# Patient Record
Sex: MALE | Race: WHITE | NOT HISPANIC OR LATINO | Employment: FULL TIME | URBAN - METROPOLITAN AREA
[De-identification: names, ages, dates, MRNs, and addresses within clinical notes are randomized per-mention and may not be internally consistent; named-entity substitution may affect disease eponyms.]

---

## 2017-07-28 ENCOUNTER — ALLSCRIPTS OFFICE VISIT (OUTPATIENT)
Dept: OTHER | Facility: OTHER | Age: 47
End: 2017-07-28

## 2017-08-28 ENCOUNTER — GENERIC CONVERSION - ENCOUNTER (OUTPATIENT)
Dept: OTHER | Facility: OTHER | Age: 47
End: 2017-08-28

## 2018-01-15 NOTE — CONSULTS
Chief Complaint  Chief Complaint Free Text Note Form: Patient is here today for MWM consult  Stop ban/8  History of Present Illness  Free Text HPI: Obesity-  Severity: Severe  Onset: 30's  Modifiers: Had been working two jobs during his 's and increased carbohydrates/sugary snacks as a source of energy  Now working 3rd shift and overtime States diets have worked for him in the past, including ideal protein, Atkins, ketosis driven diets  Typically stops after 6 weeks and gains the weight back  Finds it difficult to use supplements  Admits to Increased appetite and cravings  Does not include vegetables in his diet due to preference  Drinks gallon or more of sweet tea per day  Recently stopped drinking diet soda  Minimal water intake  Job is physical at times, but overall has a sedentary lifestyle  Associated Symptoms: joint pain    B: whatever is around, ham and cheese on an everything bagel  L: varies, depending on work, hot lunch  D: Arielle York, states "heavy, rich meals "   Heavy in carbohydrates  Past Medical History    1  History of Essential hypertension, benign (401 1) (I10)  Active Problems And Past Medical History Reviewed: The active problems and past medical history were reviewed and updated today  Assessment    1  Morbid or severe obesity due to excess calories (278 01) (E66 01)   2  Hypertension (401 9) (I10)   3  Mixed hyperlipidemia (272 2) (E78 2)   4  Elevated glucose (790 29) (R73 09)    Plan   1  (1) CBC/ PLT (NO DIFF); Status:Active; Requested for:36Ffd3502;    2  (1) COMPREHENSIVE METABOLIC PANEL; Status:Active; Requested for:67Nxx4410;    3  (1) HEMOGLOBIN A1C; Status:Active; Requested for:08Ilk3784;    4  (1) INSULIN, FASTING; Status:Active; Requested for:75Vyk6783;    5  (1) LIPID PANEL, FASTING; Status:Active; Requested for:10Eqc8786;    6  (1) TSH WITH FT4 REFLEX; Status:Active;  Requested for:34Uwg0157;     Discussion/Summary  Discussion Summary:   Patient seen and examined with PA  Agree with assessment/plan with exceptions as below: Morbid obesity-unsure on how he would like to proceed, he will simultaneously pursue surgical and nonsurgical pathways as his insurance company requires 6 months  He will start with our conservative pathway and will complete informational seminar online  Elevated glucose/? DM: Patient reports his A1c is 7% but not on medication  Recheck A1c  HTN: elevated  recently restarted meds, follow up w/ PCP  HPL: c/w statin  RTC in 3mos    78-year-old male w/ hypertension, hyperlipidemia morbid obesity here to pursue medical weight management to improve weight and health  Obesity class 3:  -discussed options of conservative vs MEEK program +/- meal replacement vs VLCD and bariatric surgery  -initial focus of 5-10% weight loss over 3-6 mos for improved health  -screening labs  -Reviewed mechanisms, risk, benefits of gastric sleeve and bypass  Pt will consider surgery as an option and plans to attend info seminar  HTN  Risks of untreated/uncontrolled HTN reviewed  Continue current tx  Follow up with PCP  Hyperlipidemia  Risks of untreated/uncontrolled hyperlipidemia reviewed  Continue current tx  Follow up with PCP  Suspect sleep apnea  +stop bang  Reviewed risks of undiagnosed and untreated sleep apnea  PT declines sleep study referral at this time  RTC in 1mos  Goals and Barriers: The patient has the current Goals:   Goals:  1  Food log www  myfitnesspal com  2  No sugary beverages  At least 64oz of water daily  3  Increase physical activity by 10 minutes daily  4  2437-7275 calories, see sample menu  Patient's Capacity to Self-Care: Patient is able to Self-Care  Patient Education: Educational resources provided: Weight loss packet provided  Understands and agrees with treatment plan: The treatment plan was reviewed with the patient/guardian   The patient/guardian understands and agrees with the treatment plan   Self Referrals:   Self Referrals: Yes      Review of Systems  Focused-Male:   Constitutional: fever  ENT: no sore throat  Cardiovascular: no chest pain and no palpitations  Respiratory: shortness of breath and SOB with high humidity, but no cough and no wheezing  Gastrointestinal: Denies GERD, but no nausea, no vomiting, no constipation and no diarrhea  Genitourinary: no dysuria  Musculoskeletal: arthralgias  Other Symptoms: Psych:  ROS Reviewed:   ROS reviewed  Active Problems    1  Hypertension (401 9) (I10)    Surgical History    1  History of Surgery Excision Lipoma  Surgical History Reviewed: The surgical history was reviewed and updated today  Family History  Mother    1  Family history of chronic obstructive pulmonary disease (V17 6) (Z82 5)   2  Family history of hypertension (V17 49) (Z82 49)  Father    3  Family history of    4  Family history of cardiac disorder (V17 49) (Z82 49)   5  Family history of diabetes mellitus (V18 0) (Z83 3)   6  Family history of hypertension (V17 49) (Z82 49)   7  Family history of myocardial infarction (V17 3) (Z82 49)  Family History Reviewed: The family history was reviewed and updated today  Social History    · Never a smoker   · No illicit drug use   · Non drinker / no alcohol use  Social History Reviewed: The social history was reviewed and updated today  Current Meds   1  HydroCHLOROthiazide 25 MG Oral Tablet; Therapy: (Recorded:91Dcv2464) to Recorded   2  Simvastatin 20 MG Oral Tablet; Therapy: (Recorded:02Bfj1216) to Recorded  Medication List Reviewed: The medication list was reviewed and updated today  Vitals  Vital Signs    Recorded: 87Lhi5313 10:20AM Recorded: 35Nkj6744 09:26AM   Temperature  97 4 F   Heart Rate  92   Systolic 515 531, LUE, Sitting   Diastolic 90 90, LUE, Sitting   Height  6 ft 1 in   Weight  382 lb 1 6 oz   BMI Calculated  50 41   BSA Calculated  2 83   Waist Circumference  62 5 in     Neck Circumference  19 in      Physical Exam    Constitutional   General appearance: No acute distress, well appearing and well nourished  Eyes   Conjunctiva and lids: No swelling, erythema, or discharge  Ears, Nose, Mouth, and Throat   Oropharynx: Normal with no erythema, edema, exudate or lesions  Pulmonary   Respiratory effort: No increased work of breathing or signs of respiratory distress  Auscultation of lungs: Clear to auscultation, equal breath sounds bilaterally, no wheezes, no rales, no rhonci  Cardiovascular   Auscultation of heart: Normal rate and rhythm, normal S1 and S2, without murmurs  Examination of extremities for edema and/or varicosities: Abnormal   trace-1+ tibial edema  Abdomen   Abdomen: Abnormal   obese, soft, nontender     Musculoskeletal   Gait and station: Normal     Psychiatric   Orientation to person, place and time: Normal          Results/Data  STOP BANG Questionnaire [id number] 09:31AM User, Ahs     Test Name Result Flag Reference   STOP BANG Questionnaire Risk of YAMILEX Intermediate Risk     Snoring: No  Tired: No  Observed: No  Blood Pressure: Yes  BMI: Yes  Age: No  Neck Circumference: Yes  Gender: Yes   STOP BANG Questionnaire YAMILEX Total Score 4     Snoring: No  Tired: No  Observed: No  Blood Pressure: Yes  BMI: Yes  Age: No  Neck Circumference: Yes  Gender: Yes       Future Appointments    Date/Time Provider Specialty Site   08/28/2017 09:30 AM Carole Severe, PAC Diabetes Educator [facility]     Signatures   Electronically signed by : TOYA Guillaume ; Jul 28 2017 11:47AM EST                       (Author)    Electronically signed by : Ted Chan [facility]; Jul 28 2017  2:06PM EST                       (Author)    Electronically signed by : TOYA Guillaume ; Jul 28 2017  2:13PM EST                       (Author)

## 2018-01-15 NOTE — MISCELLANEOUS
Provider Comments  Provider Comments:   Dear Ora Davis,    We called you about your scheduled appointment for today but were unable to reach you  It is very important that you follow up with us so that we can assess your physical and nutritional safety  Please call our office at [phone] to reschedule your appointment       Sincerely,     [facility]          Signatures   Electronically signed by : Wilmer Walker, ; Aug 28 2017  9:47AM EST                       (Author)

## 2018-01-22 VITALS
SYSTOLIC BLOOD PRESSURE: 132 MMHG | DIASTOLIC BLOOD PRESSURE: 90 MMHG | WEIGHT: 315 LBS | TEMPERATURE: 97.4 F | HEART RATE: 92 BPM | BODY MASS INDEX: 41.75 KG/M2 | HEIGHT: 73 IN

## 2018-07-25 ENCOUNTER — CLINICAL SUPPORT (OUTPATIENT)
Dept: BARIATRICS | Facility: CLINIC | Age: 48
End: 2018-07-25

## 2018-07-25 VITALS
SYSTOLIC BLOOD PRESSURE: 122 MMHG | RESPIRATION RATE: 18 BRPM | WEIGHT: 315 LBS | HEIGHT: 73 IN | DIASTOLIC BLOOD PRESSURE: 66 MMHG | BODY MASS INDEX: 41.75 KG/M2 | TEMPERATURE: 98.8 F

## 2018-07-25 DIAGNOSIS — E66.01 OBESITY, CLASS III, BMI 40-49.9 (MORBID OBESITY) (HCC): Primary | ICD-10-CM

## 2018-07-25 PROCEDURE — RECHECK

## 2018-07-25 NOTE — PROGRESS NOTES
Bariatric Behavioral Health Evaluation    Presenting Problem Patient wants to lose weight    Is the patient seeking Bariatric Surgery Eval? Yes  Patient has considered bariatric surgery for over a year and has done on line research    Realizes Post- Op Requirements? Yes     Psychiatric/Psychological Treatment Diagnosis:Patient denies    Outpatient Counselor No     Psychiatrist No     Have you had Inpatient Treatment? No     Both parents   Patient is  with 4 children    Domestic Violence No      Additional comments/stressors related to family/relationships/peer support  Patient reports wife to be supportive    Physical/Psychological Assessment:     Appearance: appropriate  Sociability: average  Affect: appropriate  Mood: calm  Thought Process: coherent  Speech: normal  Content: no impairment  Orientation: person  Yes   Insight: emotional  good    Recommendations: Recommended for surgery  yes    Risk of Harm to Self or Others: Patient denies     Access to weapons no     Based on the previous information, the client presents the following risk of harm to self or others: low     Note   Glynn Francis Completed Behavioral Health Assessment  Provided patient education as needed  Patient denies Axis 1 diagnosis  Patient  meets criteria for [address] [place] [address] bariatric  surgery program and is therefore referred to surgeon  Glynn Francis BARIATRIC SURGERY EDUCATION CHECKLIST    I have received education related to my bariatric surgery process and understand:    Patients may be required to complete a psychiatric evaluation and receive clearance for surgery from their psychiatrist     Patients who undergo weight loss surgery are at higher risk of increased mental health concerns and suicide attempts  Patients may be required to complete a full substance abuse evaluation and then complete all treatment recommendations prior to surgery      If diagnosis of abuse/dependence results, patient may be required to remain sober for one (1) year before having bariatric surgery  Patients on psychiatric medications should check with their provider to discuss psychiatric medications and the changes in absorption  Patient should discuss all time release medications with provider and take all medications as prescribed  The recommendation is that there is no use of  any tobacco products, Hookah or  vapes for the bariatric post-operation patient  Bariatric surgery patients should not consume alcohol as a post-operative patient as it may increase risk of numerous health conditions including but not limited to alcohol abuse and ulcers  There is a possibility of weight regain if patient does not follow all program guidelines and recommendations  Bariatric surgery patients should exercise thirty (30) to sixty (60) minutes per day to maintain post-surgical weight loss  Research indicates that bariatric patients are more successful when they see a therapist for up to two (2) years post-op  Patients will follow all medical and dietary recommendations provided  Patient will keep all scheduled appointments and follow up with their physician for a minimum of five (5) years  Patient will take all vitamins as recommended  Post-operative vitamins are life-long  Patient reviewed Bariatric Surgery Education Checklist and agrees they have received education on these issues

## 2018-07-25 NOTE — PROGRESS NOTES
Bariatric Behavioral Health Evaluation    Presenting Problem: Improve quality of life  Is the patient seeking Bariatric Surgery Eval? No If yes how long have you researched this surgery option  Patient has researched for over 1 year  Psychiatric/Psychological Treatment Diagnosis: None reported     Outpatient Counselor No   Psychiatrist No     Have you had Inpatient Treatment? No  ;  Both parents    with 4 children    Domestic Violence No  Additional comments/stressors related to family/relationships/peer support: spouse     Physical/Psychological Assessment:     Appearance: appropriate  Sociability: average  Affect: appropriate  Mood: calm  Thought Process: coherent  Speech: normal  Content: no impairment  Orientation: person  Yes   Insight: emotional  good    Recommendations: Recommended for surgery  yes    Risk of Harm to Self or Others: None reported     Access to weapons no     Based on the previous information, the client presents the following risk of harm to self or others: low     Note   Vicki Hernández Completed Behavioral Health Assessment  Provided patient education as needed  Patient denies to  Axis 1 diagnosis  Patient  meets criteria for [facility] bariatric  surgery program and is therefore referred to surgeon   BARIATRIC SURGERY EDUCATION CHECKLIST    I have received education related to my bariatric surgery process and understand:    Patients may be required to complete a psychiatric evaluation and receive clearance for surgery from their psychiatrist     Patients who undergo weight loss surgery are at higher risk of increased mental health concerns and suicide attempts  Patients may be required to complete a full substance abuse evaluation and then complete all treatment recommendations prior to surgery  If diagnosis of abuse/dependence results, patient may be required to remain sober for one (1) year before having bariatric surgery      Patients on psychiatric medications should check with their provider to discuss psychiatric medications and the changes in absorption  Patient should discuss all time release medications with provider and take all medications as prescribed  The recommendation is that there is no use of  any tobacco products, Hookah or  vapes for the bariatric post-operation patient  Bariatric surgery patients should not consume alcohol as a post-operative patient as it may increase risk of numerous health conditions including but not limited to alcohol abuse and ulcers  There is a possibility of weight regain if patient does not follow all program guidelines and recommendations  Bariatric surgery patients should exercise thirty (30) to sixty (60) minutes per day to maintain post-surgical weight loss  Research indicates that bariatric patients are more successful when they see a therapist for up to two (2) years post-op  Patients will follow all medical and dietary recommendations provided  Patient will keep all scheduled appointments and follow up with their physician for a minimum of five (5) years  Patient will take all vitamins as recommended  Post-operative vitamins are life-long  Patient reviewed Bariatric Surgery Education Checklist and agrees they have received education on these issues

## 2018-07-25 NOTE — PROGRESS NOTES
Bariatric Behavioral Health Evaluation    Presenting Problem Patient wants to lose weight  Is the patient seeking Bariatric Surgery Eval? Yes   Considering weight loss surgery for over 6 months     Realizes Post- Op Requirements? Yes     Psychiatric/Psychological Treatment Diagnosis: No    Outpatient Counselor No     Psychiatrist No     Have you had Inpatient Treatment? No       Parents- 3 sisters, 2 brothers,  21 years, 3 daughters, 1 son    Domestic Violence No    Abuse History:  No    Additional comments/stressors related to family/relationships/peer support  None reported    Physical/Psychological Assessment:     Appearance: appropriate  Sociability: average  Affect: appropriate  Mood: calm  Thought Process: coherent  Speech: normal  Content: no impairment  Orientation: person  Yes   Insight: emotional      Risk Assessment:  None    Recommendations: Yes    Risk of Harm to Self or Others: none reported    Access to weapons no    Based on the previous information, the client presents the following risk of harm to self or others: low     Note   Completed Behavioral Health Assessment  Provided patient education as needed  Patient denies  to  Axis 1 diagnosis  Patient  meets criteria for [facility] bariatric  surgery program and is therefore referred to surgeon  Sanam Layton BARIATRIC SURGERY EDUCATION CHECKLIST    I have received education related to my bariatric surgery process and understand:    Patients may be required to complete a psychiatric evaluation and receive clearance for surgery from their psychiatrist     Patients who undergo weight loss surgery are at higher risk of increased mental health concerns and suicide attempts  Patients may be required to complete a full substance abuse evaluation and then complete all treatment recommendations prior to surgery      If diagnosis of abuse/dependence results, patient may be required to remain sober for one (1) year before having bariatric surgery  Patients on psychiatric medications should check with their provider to discuss psychiatric medications and the changes in absorption  Patient should discuss all time release medications with provider and take all medications as prescribed  The recommendation is that there is no use of  any tobacco products, Hookah or  vapes for the bariatric post-operation patient  Bariatric surgery patients should not consume alcohol as a post-operative patient as it may increase risk of numerous health conditions including but not limited to alcohol abuse and ulcers  There is a possibility of weight regain if patient does not follow all program guidelines and recommendations  Bariatric surgery patients should exercise thirty (30) to sixty (60) minutes per day to maintain post-surgical weight loss  Research indicates that bariatric patients are more successful when they see a therapist for up to two (2) years post-op  Patients will follow all medical and dietary recommendations provided  Patient will keep all scheduled appointments and follow up with their physician for a minimum of five (5) years  Patient will take all vitamins as recommended  Post-operative vitamins are life-long  Patient reviewed Bariatric Surgery Education Checklist and agrees they have received education on these issues

## 2018-07-25 NOTE — PROGRESS NOTES
Bariatric Nutrition Assessment Note    Type of surgery    Preop  Surgery Date: Tentative February 2019  Surgeon: REGGIE    Nutrition Assessment   Mis Laguna  53 y o   male     Wt with BMI of 25: 188 6lbs  Pre-Op Excess Wt: 188 3lbs  Height 6' 1" (1 854 m), weight (!) 171 kg (376 lb 14 4 oz)  Body mass index is 49 73 kg/m²  Weight History   Onset of Obesity: Adult  Family history of obesity: Yes  Wt Loss Attempts: Commercial Programs ("[employer], [employer] Palm, etc )  Counseling with  MD  High Protein/Low CHO diets (Atkins, Union, etc )  Meal Replacements (Medifast, Slim Fast, etc )  OTC meds/supplements  Maximum Wt Lost: 40lbs    Review of History and Medications   No past medical history on file  No past surgical history on file  Social History     Social History    Marital status: /Civil Union     Spouse name: N/A    Number of children: N/A    Years of education: N/A     Social History Main Topics    Smoking status: Not on file    Smokeless tobacco: Not on file    Alcohol use Not on file    Drug use: Unknown    Sexual activity: Not on file     Other Topics Concern    Not on file     Social History Narrative    No narrative on file     No current outpatient prescriptions on file  Food Intake and Lifestyle Assessment   Food Intake Assessment completed via 24 hour recall  Breakfast: two corn muffins, diet pepsi 20oz  Snack: none   Lunch: 14" cheesesteak, two 20oz diet pepsi  Snack: chips and salsa  Dinner: fettucini damari with shrimp, half loaf itialian bread  Snack: two ice pops  Beverage intake: diet soda  Protein supplement: none  Estimated protein intake per day: 30-60g  Estimated fluid intake per day: Pt states he drinks twenty 20-oz bottles of diet pepsi daily  Meals eaten away from home: 5:  Gets take-out for lunches at work    Typical meal pattern: 3 meals per day and 2+ snacks per day  Eating Behaviors: Consumption of high calorie/ high fat foods, Large portion sizes and Frequent snacking/ grazing  Food allergies or intolerances: Allergies not on file  Cultural or Restoration considerations: none    Physical Assessment  Physical Activity  Types of exercise: None  Current physical limitations: none    Psychosocial Assessment   Support systems: spouse and children parent(s)  Socioeconomic factors: none    Nutrition Diagnosis  Diagnosis: Overweight / Obesity (NC-3 3) and Excessive energy intake (NI-1 5)  Related to: Physical inactivity and Excessive energy intake  As Evidenced by: BMI >25, Excessive energy intake and Excessive fat / cholesterol intake     Nutrition Prescription: Recommend the following diet  Low fat, Low sugar, High protein and Regular    Interventions and Teaching   Discussed pre-op and post-op nutrition guidelines  Patient educated and handouts provided    Surgical changes to stomach / GI  Capacity of post-surgery stomach  Diet progression  Adequate hydration  Sugar and fat restriction to decrease "dumping syndrome"  Fat restriction to decrease steatorrhea  Expected weight loss  Exercise  Suggestions for pre-op diet  Nutrition considerations after surgery  Protein supplements  Meal planning and preparation  Appropriate carbohydrate, protein, and fat intake, and food/fluid choices to maximize safe weight loss, nutrient intake, and tolerance   Dietary and lifestyle changes  Possible problems with poor eating habits  Techniques for self monitoring and keeping daily food journal  Potential for food intolerance after surgery, and ways to deal with them including: lactose intolerance, nausea, reflux, vomiting, diarrhea, food intolerance, appetite changes, gas  Vitamin / Mineral supplementation of Multivitamin with minerals and Vitamin D    Education provided to: patient    Barriers to learning: receptivity  Readiness to change: preparation    Prior research on procedure: pre-op class    Comprehension: verbalizes understanding     Expected Compliance: fair  Recommendations  Pt is an appropriate candidate for surgery   Yes    Evaluation / Monitoring  Dietitian to Monitor: Eating pattern as discussed Tolerance of nutrition prescription Body weight Lab values Physical activity    Goals  Food journal, Exercise 30 minutes 5 times per week, Complete lession plans 1-6, Eat 3 meals per day and eliminate soda, decrease snacking to one planned snack per day    Time Spent:   45 Minutes

## 2018-08-23 ENCOUNTER — HOSPITAL ENCOUNTER (EMERGENCY)
Facility: HOSPITAL | Age: 48
Discharge: HOME | End: 2018-08-23
Attending: EMERGENCY MEDICINE | Admitting: EMERGENCY MEDICINE
Payer: COMMERCIAL

## 2018-08-23 ENCOUNTER — APPOINTMENT (EMERGENCY)
Dept: RADIOLOGY | Facility: HOSPITAL | Age: 48
End: 2018-08-23
Payer: COMMERCIAL

## 2018-08-23 VITALS
OXYGEN SATURATION: 98 % | BODY MASS INDEX: 50.79 KG/M2 | WEIGHT: 315 LBS | HEART RATE: 74 BPM | RESPIRATION RATE: 16 BRPM | TEMPERATURE: 98.1 F | DIASTOLIC BLOOD PRESSURE: 83 MMHG | SYSTOLIC BLOOD PRESSURE: 166 MMHG

## 2018-08-23 DIAGNOSIS — N20.0 RIGHT KIDNEY STONE: Primary | ICD-10-CM

## 2018-08-23 LAB
ALBUMIN SERPL BCP-MCNC: 3.9 G/DL (ref 3.5–5)
ALP SERPL-CCNC: 73 U/L (ref 46–116)
ALT SERPL W P-5'-P-CCNC: 43 U/L (ref 12–78)
ANION GAP SERPL CALCULATED.3IONS-SCNC: 9 MMOL/L (ref 4–13)
AST SERPL W P-5'-P-CCNC: 32 U/L (ref 5–45)
BASOPHILS # BLD AUTO: 0.02 THOUSANDS/ΜL (ref 0–0.1)
BASOPHILS NFR BLD AUTO: 0 % (ref 0–1)
BILIRUB SERPL-MCNC: 0.4 MG/DL (ref 0.2–1)
BILIRUB UR QL STRIP: NEGATIVE
BUN SERPL-MCNC: 11 MG/DL (ref 5–25)
CALCIUM SERPL-MCNC: 8.5 MG/DL (ref 8.3–10.1)
CHLORIDE SERPL-SCNC: 102 MMOL/L (ref 100–108)
CLARITY UR: CLEAR
CO2 SERPL-SCNC: 26 MMOL/L (ref 21–32)
COLOR UR: NORMAL
CREAT SERPL-MCNC: 1.02 MG/DL (ref 0.6–1.3)
EOSINOPHIL # BLD AUTO: 0.03 THOUSAND/ΜL (ref 0–0.61)
EOSINOPHIL NFR BLD AUTO: 0 % (ref 0–6)
ERYTHROCYTE [DISTWIDTH] IN BLOOD BY AUTOMATED COUNT: 13.5 % (ref 11.6–15.1)
GFR SERPL CREATININE-BSD FRML MDRD: 87 ML/MIN/1.73SQ M
GLUCOSE SERPL-MCNC: 152 MG/DL (ref 65–140)
GLUCOSE UR STRIP-MCNC: NEGATIVE MG/DL
HCT VFR BLD AUTO: 42.7 % (ref 36.5–49.3)
HGB BLD-MCNC: 14.4 G/DL (ref 12–17)
HGB UR QL STRIP.AUTO: NEGATIVE
IMM GRANULOCYTES # BLD AUTO: 0.09 THOUSAND/UL (ref 0–0.2)
IMM GRANULOCYTES NFR BLD AUTO: 1 % (ref 0–2)
KETONES UR STRIP-MCNC: NEGATIVE MG/DL
LEUKOCYTE ESTERASE UR QL STRIP: NEGATIVE
LYMPHOCYTES # BLD AUTO: 1.89 THOUSANDS/ΜL (ref 0.6–4.47)
LYMPHOCYTES NFR BLD AUTO: 23 % (ref 14–44)
MCH RBC QN AUTO: 30.1 PG (ref 26.8–34.3)
MCHC RBC AUTO-ENTMCNC: 33.7 G/DL (ref 31.4–37.4)
MCV RBC AUTO: 89 FL (ref 82–98)
MONOCYTES # BLD AUTO: 0.68 THOUSAND/ΜL (ref 0.17–1.22)
MONOCYTES NFR BLD AUTO: 8 % (ref 4–12)
NEUTROPHILS # BLD AUTO: 5.43 THOUSANDS/ΜL (ref 1.85–7.62)
NEUTS SEG NFR BLD AUTO: 68 % (ref 43–75)
NITRITE UR QL STRIP: NEGATIVE
NRBC BLD AUTO-RTO: 0 /100 WBCS
PH UR STRIP.AUTO: 6.5 [PH] (ref 5–9)
PLATELET # BLD AUTO: 224 THOUSANDS/UL (ref 149–390)
PMV BLD AUTO: 8.9 FL (ref 8.9–12.7)
POTASSIUM SERPL-SCNC: 3.6 MMOL/L (ref 3.5–5.3)
PROT SERPL-MCNC: 7.7 G/DL (ref 6.4–8.2)
PROT UR STRIP-MCNC: NEGATIVE MG/DL
RBC # BLD AUTO: 4.78 MILLION/UL (ref 3.88–5.62)
SODIUM SERPL-SCNC: 137 MMOL/L (ref 136–145)
SP GR UR STRIP.AUTO: 1.02 (ref 1–1.03)
UROBILINOGEN UR QL STRIP.AUTO: 0.2 E.U./DL
WBC # BLD AUTO: 8.14 THOUSAND/UL (ref 4.31–10.16)

## 2018-08-23 PROCEDURE — 99284 EMERGENCY DEPT VISIT MOD MDM: CPT

## 2018-08-23 PROCEDURE — 81003 URINALYSIS AUTO W/O SCOPE: CPT | Performed by: EMERGENCY MEDICINE

## 2018-08-23 PROCEDURE — 87086 URINE CULTURE/COLONY COUNT: CPT | Performed by: EMERGENCY MEDICINE

## 2018-08-23 PROCEDURE — 96361 HYDRATE IV INFUSION ADD-ON: CPT

## 2018-08-23 PROCEDURE — 36415 COLL VENOUS BLD VENIPUNCTURE: CPT | Performed by: EMERGENCY MEDICINE

## 2018-08-23 PROCEDURE — 74176 CT ABD & PELVIS W/O CONTRAST: CPT

## 2018-08-23 PROCEDURE — 96375 TX/PRO/DX INJ NEW DRUG ADDON: CPT

## 2018-08-23 PROCEDURE — 85025 COMPLETE CBC W/AUTO DIFF WBC: CPT | Performed by: EMERGENCY MEDICINE

## 2018-08-23 PROCEDURE — 96374 THER/PROPH/DIAG INJ IV PUSH: CPT

## 2018-08-23 PROCEDURE — 80053 COMPREHEN METABOLIC PANEL: CPT | Performed by: EMERGENCY MEDICINE

## 2018-08-23 RX ORDER — KETOROLAC TROMETHAMINE 30 MG/ML
15 INJECTION, SOLUTION INTRAMUSCULAR; INTRAVENOUS ONCE
Status: COMPLETED | OUTPATIENT
Start: 2018-08-23 | End: 2018-08-23

## 2018-08-23 RX ORDER — OXYCODONE HYDROCHLORIDE AND ACETAMINOPHEN 5; 325 MG/1; MG/1
1 TABLET ORAL EVERY 4 HOURS PRN
Qty: 8 TABLET | Refills: 0 | Status: SHIPPED | OUTPATIENT
Start: 2018-08-23 | End: 2018-09-02

## 2018-08-23 RX ORDER — ONDANSETRON 2 MG/ML
4 INJECTION INTRAMUSCULAR; INTRAVENOUS ONCE
Status: COMPLETED | OUTPATIENT
Start: 2018-08-23 | End: 2018-08-23

## 2018-08-23 RX ADMIN — HYDROMORPHONE HYDROCHLORIDE 1 MG: 1 INJECTION, SOLUTION INTRAMUSCULAR; INTRAVENOUS; SUBCUTANEOUS at 06:47

## 2018-08-23 RX ADMIN — ONDANSETRON 4 MG: 2 INJECTION INTRAMUSCULAR; INTRAVENOUS at 06:47

## 2018-08-23 RX ADMIN — SODIUM CHLORIDE 1000 ML: 0.9 INJECTION, SOLUTION INTRAVENOUS at 06:48

## 2018-08-23 RX ADMIN — KETOROLAC TROMETHAMINE 15 MG: 30 INJECTION, SOLUTION INTRAMUSCULAR at 06:49

## 2018-08-23 NOTE — ED NOTES
Returned from CT scan    Denies pain at present     Hyun Sullivan, ANDRE  08/23/18 189 Ej Meléndez, [facility]  08/23/18 [id number]

## 2018-08-23 NOTE — ED NOTES
Initial bladder scan only 43 cc but client unable to tolerate test well  Client medicated for pain and oncoming RN aware that scan should be repeated  MD also aware of amount of scan     Geraldo Phoenix, RN  08/23/18 [id number]

## 2018-08-23 NOTE — DISCHARGE INSTRUCTIONS
Kidney Stones   WHAT YOU NEED TO KNOW:   Kidney stones form in the urinary system when the water and waste in your urine are out of balance  When this happens, certain types of waste crystals separate from the urine  The crystals build up and form kidney stones  You may have 1 or more kidney stones  DISCHARGE INSTRUCTIONS:   Return to the emergency department if:   · You have vomiting that is not relieved by medicine  Contact your healthcare provider if:   · You have a fever  · You have trouble passing urine  · You see blood in your urine  · You have severe pain  · You have any questions or concerns about your condition or care  Medicines:   · NSAIDs , such as ibuprofen, help decrease swelling, pain, and fever  This medicine is available with or without a doctor's order  NSAIDs can cause stomach bleeding or kidney problems in certain people  If you take blood thinner medicine, always ask your healthcare provider if NSAIDs are safe for you  Always read the medicine label and follow directions  · Prescription medicine  may be given  Ask how to take this medicine safely  · Medicines  to balance your electrolytes may be needed  · Take your medicine as directed  Contact your healthcare provider if you think your medicine is not helping or if you have side effects  Tell him or her if you are allergic to any medicine  Keep a list of the medicines, vitamins, and herbs you take  Include the amounts, and when and why you take them  Bring the list or the pill bottles to follow-up visits  Carry your medicine list with you in case of an emergency  Follow up with your healthcare provider as directed: You may need to return for more tests  Write down your questions so you remember to ask them during your visits  Self-care:   · Drink plenty of liquids  Your healthcare provider may tell you to drink at least 8 to 12 (eight-ounce) cups of liquids each day   This helps flush out the kidney stones when you urinate  Water is the best liquid to drink  · Strain your urine every time you go to the bathroom  Urinate through a strainer or a piece of thin cloth to catch the stones  Take the stones to your healthcare provider so they can be sent to the lab for tests  This will help your healthcare providers plan the best treatment for you  · Eat a variety of healthy foods  Healthy foods include fruits, vegetables, whole-grain breads, low-fat dairy products, beans, and fish  You may need to limit how much sodium (salt) or protein you eat  Ask for information about the best foods for you  · Stay active  Your stones may pass more easily by if you stay active  Ask about the best activities for you  After you pass your kidney stones:  Once you have passed your kidney stones, your healthcare provider may  order a 24-hour urine test  Results from a 24-hour urine test will help your healthcare provider plan ways to prevent more stones from forming  If you are told to do a 24-hour test, your healthcare provider will give you more instructions  © 2017 [facility] Information is for End User's use only and may not be sold, redistributed or otherwise used for commercial purposes  All illustrations and images included in CareNotes® are the copyrighted property of [employer]  or [employer]  The above information is an  only  It is not intended as medical advice for individual conditions or treatments  Talk to your doctor, nurse or pharmacist before following any medical regimen to see if it is safe and effective for you

## 2018-08-23 NOTE — ED PROVIDER NOTES
History  Chief Complaint   Patient presents with    Flank Pain     started at 3 am    Abdominal Pain     states he now has a pressure pain below umbilicus    Vomiting     vomited once    Nausea    Urinary Retention     has not voided since 8pm last night     52 y/o male presents with acute onset right-sided flank pain at 3:00 a m  this morning that is now radiating down his right lower abdomen with supraumbilical pain and pressure  Associated nausea and 1 episode of nonbilious nonbloody emesis  Also says he has not passed urine since 8:00 p m  last night  History of kidney stones  Denies any fevers chills dysuria urgency frequency diarrhea constipation or any other symptoms  Pain currently is 10/10 nothing makes it better or worse it is sharp and continuous  History provided by:  Patient   used: No        None       Past Medical History:   Diagnosis Date    H/O lymphoma     H/O renal calculi     Morbid obesity ([address] )        Past Surgical History:   Procedure Laterality Date    LYMPH NODE DISSECTION      Neck       Family History   Problem Relation Age of Onset    Hypertension Mother     Heart attack Mother     Hypertension Father     Heart disease Father     Heart attack Father      I have reviewed and agree with the history as documented  Social History   Substance Use Topics    Smoking status: Never Smoker    Smokeless tobacco: Never Used    Alcohol use No        Review of Systems   All other systems reviewed and are negative  Physical Exam  Physical Exam   Constitutional: He is oriented to person, place, and time  He appears well-developed and well-nourished  HENT:   Head: Normocephalic and atraumatic  Eyes: EOM are normal  Pupils are equal, round, and reactive to light  Neck: Normal range of motion  Neck supple  Cardiovascular: Normal rate and regular rhythm  Pulmonary/Chest: Effort normal and breath sounds normal    Abdominal: Soft   Bowel sounds are normal    Right-sided CVA tenderness and right lower abdominal tenderness noted  Musculoskeletal: Normal range of motion  Neurological: He is alert and oriented to person, place, and time  Skin: Skin is warm and dry  Psychiatric: He has a normal mood and affect  Nursing note and vitals reviewed  Vital Signs  ED Triage Vitals [08/23/18 0633]   Temperature Pulse Respirations Blood Pressure SpO2   98 1 °F (36 7 °C) 74 16 166/83 98 %      Temp Source Heart Rate Source Patient Position - Orthostatic VS BP Location FiO2 (%)   Tympanic -- Sitting Right arm --      Pain Score       6           Vitals:    08/23/18 0633   BP: 166/83   Pulse: 74   Patient Position - Orthostatic VS: Sitting       Visual Acuity      ED Medications  Medications   HYDROmorphone (DILAUDID) injection 1 mg (not administered)   ondansetron (ZOFRAN) injection 4 mg (not administered)   sodium chloride 0 9 % bolus 1,000 mL (not administered)   ketorolac (TORADOL) injection 15 mg (not administered)       Diagnostic Studies  Results Reviewed     Procedure Component Value Units Date/Time    CBC and differential [[id number]] Collected:  08/23/18 0646    Lab Status:  No result Specimen:  Blood from Arm, Right     Comprehensive metabolic panel [[id number]] Collected:  08/23/18 0646    Lab Status:  No result Specimen:  Blood from Arm, Right     UA w Reflex to Microscopic [[id number]]     Lab Status:  No result Specimen:  Urine     Urine culture [[id number]]     Lab Status:  No result Specimen:  Urine                  CT renal stone study abdomen pelvis without contrast    (Results Pending)              Procedures  Procedures       Phone Contacts  ED Phone Contact    ED Course                               MDM  Number of Diagnoses or Management Options  Diagnosis management comments: Patient evaluated with labs, CT scan of the abdomen pelvis given IV fluids pain medication and nausea medicine    Labs, CT scan pending care transitioned to   Ingris Moore       Amount and/or Complexity of Data Reviewed  Clinical lab tests: ordered  Tests in the radiology section of CPT®: ordered  Tests in the medicine section of CPT®: ordered    Patient Progress  Patient progress: stable    CritCare Time    Disposition  Final diagnoses:   None     ED Disposition     None      Follow-up Information    None         Patient's Medications    No medications on file     No discharge procedures on file      ED Provider  Electronically Signed by           Pennie Sunshine, DO  08/23/18 4581       Ivis Spicer, DO  08/23/18 0129

## 2018-08-24 LAB — BACTERIA UR CULT: NORMAL

## 2018-08-31 PROBLEM — E66.01 OBESITY, CLASS III, BMI 40-49.9 (MORBID OBESITY) (HCC): Status: ACTIVE | Noted: 2018-08-31

## 2018-09-27 ENCOUNTER — OFFICE VISIT (OUTPATIENT)
Dept: BARIATRICS | Facility: CLINIC | Age: 48
End: 2018-09-27
Payer: COMMERCIAL

## 2018-09-27 VITALS
DIASTOLIC BLOOD PRESSURE: 80 MMHG | RESPIRATION RATE: 18 BRPM | HEART RATE: 78 BPM | HEIGHT: 73 IN | BODY MASS INDEX: 41.75 KG/M2 | TEMPERATURE: 98 F | SYSTOLIC BLOOD PRESSURE: 142 MMHG | WEIGHT: 315 LBS

## 2018-09-27 DIAGNOSIS — E66.01 MORBID (SEVERE) OBESITY DUE TO EXCESS CALORIES (HCC): Primary | ICD-10-CM

## 2018-09-27 DIAGNOSIS — I10 HYPERTENSION, UNSPECIFIED TYPE: ICD-10-CM

## 2018-09-27 DIAGNOSIS — E78.2 MIXED HYPERLIPIDEMIA: ICD-10-CM

## 2018-09-27 DIAGNOSIS — R73.09 ELEVATED GLUCOSE: ICD-10-CM

## 2018-09-27 PROCEDURE — 99204 OFFICE O/P NEW MOD 45 MIN: CPT | Performed by: SURGERY

## 2018-09-27 NOTE — LETTER
September 27, 2018     Judi Engel MD  [id number] [facility] [address]    Patient: Samuel Storey   YOB: 1970   Date of Visit: 9/27/2018       Dear Dr Mimi Kenney:    Thank you for referring Lida Niño to me for evaluation  Below are my notes for this consultation  If you have questions, please do not hesitate to call me  I look forward to following your patient along with you  Sincerely,        Gualberto Hanson MD        CC: No Recipients  Gualberto Hanson MD  9/27/2018  4:05 PM  Sign at close encounter      Agustina Torres 53 y o  male MRN: [id number]  Unit/Bed#:  Encounter: [id number]      HPI:  Samuel Storey is a 53 y o  male who presents with a longstanding history of morbid obesity and inability to sustain a meaningful weight loss  Here today to discuss bariatric options  Visit type: initial visit    Symptoms: inability to loss weight    Associated Symptoms: anxiety    Associated Conditions: glucose intolerance, hyperlipidemia and abdominal obesity  Disease Complications: hypertension  Weight Loss Interest: high  Previous Diet Trials: low calorie     Exercise Frequency:infrequency  Types of Exercise: walking    Review of Systems   All other systems reviewed and are negative        Historical Information   Past Medical History:   Diagnosis Date    H/O lymphoma     H/O renal calculi     Hypertension 7/28/2017    Morbid obesity ([address] )      Past Surgical History:   Procedure Laterality Date    LYMPH NODE DISSECTION      Neck     Social History   History   Alcohol Use No     History   Drug Use No     History   Smoking Status    Never Smoker   Smokeless Tobacco    Never Used     Family History: non-contributory    Meds/Allergies   all medications and allergies reviewed  No Known Allergies    Objective     Current Vitals:   Blood Pressure: 142/80 (09/27/18 1522)  Pulse: 78 (09/27/18 1522)  Temperature: 98 °F (36 7 °C) (09/27/18 1522)  Respirations: 18 (09/27/18 1522)  Height: 6' 1" (185 4 cm) (09/27/18 1522)  Weight - Scale: (!) 172 kg (380 lb) (09/27/18 1522)      Invasive Devices          No matching active lines, drains, or airways          Physical Exam   Constitutional: He is oriented to person, place, and time  He appears well-developed and well-nourished  No distress  Neurological: He is alert and oriented to person, place, and time  Psychiatric: He has a normal mood and affect  His behavior is normal  Judgment and thought content normal        Lab Results: I have personally reviewed pertinent lab results  Imaging: I have personally reviewed pertinent reports  EKG, Pathology, and Other Studies: I have personally reviewed pertinent reports  Assessment/PLAN:    53 y o  yo male with a long standing h/o of obesity and inability to sustain any meaningful weight loss on his own despite several attempts  He is interested in the Laparoscopic Sleeve gastrectomy  As a part of his pre op evaluation, he will be referred to a cardiologist and for a sleep evaluation and consult  He needs an EGD to evaluate the anatomy of his GI tract  I have spent over 45 minutes with him face to face in the office today discussing his options and details of the surgery  We have seen an animation of the surgery on the computer that illustrates how the operation is done and how the anatomy will be altered with the procedure  Over 50% of this was coordinating care  I have discussed and educated the patient with regards to the components of our multidisciplinary program and the importance of compliance and follow up in the post operative period  He was given the opportunity to ask questions and I have answered all of them      The patient was also instructed with regards to the importance of behavior modification, nutritional counseling, support meeting attendance and lifestyle changes that are important to ensure success  Although there is a great statistical chance of improvement or even resolution of most of his associated comorbidities, the results vary from patient to patient and they largely depend on his commitment and compliance  He needs to lose 18 lbs prior to the operation        Gene Francis MD  9/27/2018  3:34 PM

## 2018-09-27 NOTE — PROGRESS NOTES
BARIATRIC INITIAL CONSULT - BARIATRIC SURGERY    Natali Gandhi 53 y o  male MRN: [id number]  Unit/Bed#:  Encounter: [id number]      HPI:  Natali Gandhi is a 53 y o  male who presents with a longstanding history of morbid obesity and inability to sustain a meaningful weight loss  Here today to discuss bariatric options  Visit type: initial visit    Symptoms: inability to loss weight    Associated Symptoms: anxiety    Associated Conditions: glucose intolerance, hyperlipidemia and abdominal obesity  Disease Complications: hypertension  Weight Loss Interest: high  Previous Diet Trials: low calorie     Exercise Frequency:infrequency  Types of Exercise: walking    Review of Systems   All other systems reviewed and are negative  Historical Information   Past Medical History:   Diagnosis Date    H/O lymphoma     H/O renal calculi     Hypertension 7/28/2017    Morbid obesity ([address] )      Past Surgical History:   Procedure Laterality Date    LYMPH NODE DISSECTION      Neck     Social History   History   Alcohol Use No     History   Drug Use No     History   Smoking Status    Never Smoker   Smokeless Tobacco    Never Used     Family History: non-contributory    Meds/Allergies   all medications and allergies reviewed  No Known Allergies    Objective     Current Vitals:   Blood Pressure: 142/80 (09/27/18 1522)  Pulse: 78 (09/27/18 1522)  Temperature: 98 °F (36 7 °C) (09/27/18 1522)  Respirations: 18 (09/27/18 1522)  Height: 6' 1" (185 4 cm) (09/27/18 1522)  Weight - Scale: (!) 172 kg (380 lb) (09/27/18 1522)      Invasive Devices          No matching active lines, drains, or airways          Physical Exam   Constitutional: He is oriented to person, place, and time  He appears well-developed and well-nourished  No distress  Neurological: He is alert and oriented to person, place, and time  Psychiatric: He has a normal mood and affect   His behavior is normal  Judgment and thought content normal  Lab Results: I have personally reviewed pertinent lab results  Imaging: I have personally reviewed pertinent reports  EKG, Pathology, and Other Studies: I have personally reviewed pertinent reports  Assessment/PLAN:    53 y o  yo male with a long standing h/o of obesity and inability to sustain any meaningful weight loss on his own despite several attempts  He is interested in the Laparoscopic Sleeve gastrectomy  As a part of his pre op evaluation, he will be referred to a cardiologist and for a sleep evaluation and consult  He needs an EGD to evaluate the anatomy of his GI tract  I have spent over 45 minutes with him face to face in the office today discussing his options and details of the surgery  We have seen an animation of the surgery on the computer that illustrates how the operation is done and how the anatomy will be altered with the procedure  Over 50% of this was coordinating care  I have discussed and educated the patient with regards to the components of our multidisciplinary program and the importance of compliance and follow up in the post operative period  He was given the opportunity to ask questions and I have answered all of them  The patient was also instructed with regards to the importance of behavior modification, nutritional counseling, support meeting attendance and lifestyle changes that are important to ensure success  Although there is a great statistical chance of improvement or even resolution of most of his associated comorbidities, the results vary from patient to patient and they largely depend on his commitment and compliance  He needs to lose 18 lbs prior to the operation        Blue Coelho MD  9/27/2018  3:34 PM

## 2018-11-06 ENCOUNTER — ANESTHESIA EVENT (OUTPATIENT)
Dept: GASTROENTEROLOGY | Facility: HOSPITAL | Age: 48
End: 2018-11-06

## 2018-11-06 NOTE — ANESTHESIA PREPROCEDURE EVALUATION
Review of Systems/Medical History  Patient summary reviewed  Chart reviewed  No history of anesthetic complications     Cardiovascular  Hyperlipidemia, Hypertension ,    Pulmonary  Negative pulmonary ROS        GI/Hepatic  Negative GI/hepatic ROS          Kidney stones,        Endo/Other    Comment: H/o lymphoma Obesity  morbid obesity   GYN       Hematology  Negative hematology ROS      Musculoskeletal  Negative musculoskeletal ROS        Neurology  Negative neurology ROS      Psychology   Negative psychology ROS                   Anesthesia Plan  ASA Score- 3     Anesthesia Type- IV sedation with anesthesia with ASA Monitors  Additional Monitors:   Airway Plan:         Plan Factors- Patient instructed to abstain from smoking on day of procedure  Patient did not smoke on day of surgery  Induction- intravenous  Postoperative Plan-     Informed Consent- Anesthetic plan and risks discussed with patient

## 2018-11-07 ENCOUNTER — ANESTHESIA (OUTPATIENT)
Dept: GASTROENTEROLOGY | Facility: HOSPITAL | Age: 48
End: 2018-11-07

## 2019-03-19 ENCOUNTER — APPOINTMENT (OUTPATIENT)
Dept: LAB | Facility: HOSPITAL | Age: 49
End: 2019-03-19
Payer: COMMERCIAL

## 2019-03-19 ENCOUNTER — TRANSCRIBE ORDERS (OUTPATIENT)
Dept: ADMINISTRATIVE | Facility: HOSPITAL | Age: 49
End: 2019-03-19

## 2019-03-19 ENCOUNTER — HOSPITAL ENCOUNTER (OUTPATIENT)
Dept: RADIOLOGY | Facility: HOSPITAL | Age: 49
Discharge: HOME | End: 2019-03-19
Payer: COMMERCIAL

## 2019-03-19 ENCOUNTER — LAB (OUTPATIENT)
Dept: LAB | Facility: HOSPITAL | Age: 49
End: 2019-03-19
Payer: COMMERCIAL

## 2019-03-19 DIAGNOSIS — Z01.818 PRE-OP EXAMINATION: ICD-10-CM

## 2019-03-19 DIAGNOSIS — Z01.818 PRE-OP EXAMINATION: Primary | ICD-10-CM

## 2019-03-19 LAB
ANION GAP SERPL CALCULATED.3IONS-SCNC: 6 MMOL/L (ref 4–13)
APTT PPP: 28 SECONDS (ref 24–33)
BUN SERPL-MCNC: 14 MG/DL (ref 5–25)
CALCIUM SERPL-MCNC: 9.1 MG/DL (ref 8.3–10.1)
CHLORIDE SERPL-SCNC: 103 MMOL/L (ref 100–108)
CO2 SERPL-SCNC: 29 MMOL/L (ref 21–32)
CREAT SERPL-MCNC: 0.81 MG/DL (ref 0.6–1.3)
ERYTHROCYTE [DISTWIDTH] IN BLOOD BY AUTOMATED COUNT: 13.4 % (ref 11.6–15.1)
GFR SERPL CREATININE-BSD FRML MDRD: 105 ML/MIN/1.73SQ M
GLUCOSE SERPL-MCNC: 122 MG/DL (ref 65–140)
HCT VFR BLD AUTO: 40.7 % (ref 36.5–49.3)
HGB BLD-MCNC: 13.9 G/DL (ref 12–17)
INR PPP: 0.96 (ref 0.86–1.16)
MCH RBC QN AUTO: 29.7 PG (ref 26.8–34.3)
MCHC RBC AUTO-ENTMCNC: 34.2 G/DL (ref 31.4–37.4)
MCV RBC AUTO: 87 FL (ref 82–98)
PLATELET # BLD AUTO: 264 THOUSANDS/UL (ref 149–390)
PMV BLD AUTO: 8.8 FL (ref 8.9–12.7)
POTASSIUM SERPL-SCNC: 3.3 MMOL/L (ref 3.5–5.3)
PROTHROMBIN TIME: 10.1 SECONDS (ref 9.4–11.7)
RBC # BLD AUTO: 4.68 MILLION/UL (ref 3.88–5.62)
SODIUM SERPL-SCNC: 138 MMOL/L (ref 136–145)
WBC # BLD AUTO: 7.69 THOUSAND/UL (ref 4.31–10.16)

## 2019-03-19 PROCEDURE — 85027 COMPLETE CBC AUTOMATED: CPT

## 2019-03-19 PROCEDURE — 80048 BASIC METABOLIC PNL TOTAL CA: CPT

## 2019-03-19 PROCEDURE — 85610 PROTHROMBIN TIME: CPT

## 2019-03-19 PROCEDURE — 36415 COLL VENOUS BLD VENIPUNCTURE: CPT

## 2019-03-19 PROCEDURE — 85730 THROMBOPLASTIN TIME PARTIAL: CPT

## 2019-03-19 PROCEDURE — 93005 ELECTROCARDIOGRAM TRACING: CPT

## 2019-03-19 PROCEDURE — 71046 X-RAY EXAM CHEST 2 VIEWS: CPT

## 2019-03-20 LAB
ATRIAL RATE: 81 BPM
P AXIS: 29 DEGREES
PR INTERVAL: 172 MS
QRS AXIS: 9 DEGREES
QRSD INTERVAL: 102 MS
QT INTERVAL: 412 MS
QTC INTERVAL: 478 MS
T WAVE AXIS: 35 DEGREES
VENTRICULAR RATE: 81 BPM

## 2019-03-20 PROCEDURE — 93010 ELECTROCARDIOGRAM REPORT: CPT | Performed by: INTERNAL MEDICINE
